# Patient Record
Sex: FEMALE | URBAN - METROPOLITAN AREA
[De-identification: names, ages, dates, MRNs, and addresses within clinical notes are randomized per-mention and may not be internally consistent; named-entity substitution may affect disease eponyms.]

---

## 2017-02-13 ENCOUNTER — IMPORTED ENCOUNTER (OUTPATIENT)
Dept: URBAN - METROPOLITAN AREA CLINIC 1 | Facility: CLINIC | Age: 37
End: 2017-02-13

## 2017-02-13 PROBLEM — H10.45: Noted: 2017-02-13

## 2017-02-13 PROBLEM — H00.11: Noted: 2017-02-13

## 2017-02-13 PROBLEM — H01.001: Noted: 2017-02-13

## 2017-02-13 PROBLEM — H00.12: Noted: 2017-02-13

## 2017-02-13 PROBLEM — H01.004: Noted: 2017-02-13

## 2017-02-13 PROCEDURE — 92012 INTRM OPH EXAM EST PATIENT: CPT

## 2017-02-13 NOTE — PATIENT DISCUSSION
1.  Chalazion right lower eyelid - Hot compresses TID x 5 minutes for 3 weeks. If without improvement discussed with patient possible Incision and Drainage procedure. Risks and benefits discussed with patient and patient states full understanding. Also REC fish oil vitamin that may help with prevention 2. Allergic Conjunctivitis OU :  Condition discussed with patient. Advised patient to use OTC Zaditor one drop OU BID prn.3. Blepharitis anterior type OU - Daily warm compresses and lid scrubs were recommended-BID OU. 4. Return as scheduled with Dr. Nirmala Magaña.

## 2017-06-09 ENCOUNTER — IMPORTED ENCOUNTER (OUTPATIENT)
Dept: URBAN - METROPOLITAN AREA CLINIC 1 | Facility: CLINIC | Age: 37
End: 2017-06-09

## 2017-06-09 PROBLEM — H52.223: Noted: 2017-06-09

## 2017-06-09 PROBLEM — H52.13: Noted: 2017-06-09

## 2017-06-09 PROCEDURE — S0621 ROUTINE OPHTHALMOLOGICAL EXA: HCPCS

## 2017-06-09 NOTE — PATIENT DISCUSSION
1. Myopia OU 2. Astigmatism OU- Rx was given for correction if indicated and requested. 3. Return for an appointment in 1 year for 40 with Dr. Cipriano Kirk.

## 2018-05-04 NOTE — PATIENT DISCUSSION
CORNEAL ABRASION: OD, POSSIBLY VIRAL (HX RECURRENT COLD SORES) PRESCRIBE: EMYCIN ALBA QHS X 3 DAYS, 1 GRAM VALTREX TID PO X 1 WEEK. RETURN FOR FOLLOW-UP AS SCHEDULED.

## 2018-05-11 NOTE — PATIENT DISCUSSION
EXPOSURE KERATOCONJUNCTIVITIS : PRESCRIBED PRESERVATIVE FREE ARTIFICIAL TEARS 4-6X A DAY, OU AND THE DAILY INTAKE OF OMEGA-3 DHA/EPA FATTY ACIDS TO HELP RELIEVE SYMPTOMS. ADD NIGHTLY LUBRICATING OINTMENT OR GEL.

## 2018-05-11 NOTE — PATIENT DISCUSSION
RECURRING CORNEAL EROSION - OD. PRESCRIBE LUBRICATING ARTIFICIAL TEARS BID - QID OU. ADD GEL DROP AT BEDTIME.

## 2018-08-13 NOTE — PATIENT DISCUSSION
EXPOSURE KERATOCONJUNCTIVITIS : CONTINUE PRESERVATIVE FREE ARTIFICIAL TEARS 4-6X A DAY, OU AND THE DAILY INTAKE OF OMEGA-3 DHA/EPA FATTY ACIDS TO HELP RELIEVE SYMPTOMS. ADD NIGHTLY LUBRICATING OINTMENT OR GEL.

## 2018-08-13 NOTE — PATIENT DISCUSSION
RECURRING CORNEAL EROSION - OD. CONTINUE LUBRICATING ARTIFICIAL TEARS BID - QID OU. ADD GEL DROP AT BEDTIME.

## 2018-10-12 ENCOUNTER — HOSPITAL ENCOUNTER (OUTPATIENT)
Dept: PHYSICAL THERAPY | Age: 38
Discharge: HOME | End: 2018-10-12
Payer: COMMERCIAL

## 2018-10-12 PROCEDURE — 97162 PT EVAL MOD COMPLEX 30 MIN: CPT

## 2018-10-12 PROCEDURE — 97016 VASOPNEUMATIC DEVICE THERAPY: CPT

## 2018-10-12 NOTE — PROGRESS NOTES
[address] PHYSICAL THERAPY AT [facility] [address]  Phone [phone]  Fax [phone] PLAN OF CARE / STATEMENT OF MEDICAL NECESSITY FOR PHYSICAL THERAPY SERVICES Patient Name: Danika Hernadez : 1980 Medical  
Diagnosis: Shoulder pain [M25.519] Treatment Diagnosis: Right Shoulder pain Onset Date: 3 weeks ago Referral Source: Tico Gerardo MD Start of Care [facility]): 10/12/2018 Prior Hospitalization: See medical history Provider #: [id number] Prior Level of Function: Functionally I Comorbidities: MS Relapsing remitting and thyroid disrer Medications: Verified on Patient Summary List  
The Plan of Care and following information is based on the information from the initial evaluation.  
================================================================================= Assessment / key information:   
34year old female presents for PT evaluation for Right Shoulder pain. Patient reports that the pain is to the lateral and anterior Right shoulder that began about 3 weeks ago. Patient reports pain ranging from a 0/10 at rest to a 8/10 with any overhead motion or sleeping on the right side. Patient does report another episode 3 years ago, that was treated with a cortisone injection and resolved the pain. Patient also complains of numbness to the right thumb. PMHX is significant for MS relapsing remitting with last relapse of MS 4 years ago. Patient does report heat sensitivity and fatigue as MD symptoms. Patient is employed full time and performs desk work. Functional limitations include inability to participate in recreational activities of swimming and lifting weights without an increase in pain. FOTO= 59/100 Clinical exam reveals full AROM of Right shoulder with (+) pain ful arc of motion starting at ~90* of shoulder elevation.  Patient is TTP along SS tendon. Significant trigger points present along Right Upper Traps, Rhomboids, Levator scapulae and middle traps. (+) Cain Odor sign for impingement. Patients sitting posture is remarkable for Right shoulder being anterior and superior compared to left side. Right shoulder Flexion 4/5, Scaption 4/5, ER 4/5- all positive for increased pain. Right Lower Trap 4/5. Left UE 5/5. Patient is Right handed. Patient will benefit from an episode of skilled PT to address above functional limitations and clinical deficits to allow for improved QOL and return to PLOF.  
================================================================================= Eval Complexity: History: MEDIUM  Complexity : 1-2 comorbidities / personal factors will impact the outcome/ POC Exam:HIGH Complexity : 4+ Standardized tests and measures addressing body structure, function, activity limitation and / or participation in recreation  Presentation: MEDIUM Complexity : Evolving with changing characteristics  Clinical Decision Making:MEDIUM Complexity : FOTO score of 26-74Overall Complexity:MEDIUM Problem List: pain affecting function, decrease strength, edema affecting function, impaired gait/ balance, decrease ADL/ functional abilitiies, decrease activity tolerance and decrease flexibility/ joint mobility Treatment Plan may include any combination of the following: Therapeutic exercise, Therapeutic activities, Physical agent/modality, Manual therapy, Patient education, Self Care training and Functional mobility training Patient / Family readiness to learn indicated by: asking questions Persons(s) to be included in education: patient (P) Barriers to Learning/Limitations: None Measures taken:   
Patient Goal (s): To be able to return to swimming and lifting weights Patient self reported health status: good Rehabilitation Potential: good ·                   Short Term Goals: To be accomplished in  3  treatments 1. Patient will demonstrate I and compliance with HEP to increase scapular strength and improve pain free AROM of Right UE. 2. Patient will report decreased pain no greater than 1/10 with activity to allow increased tolerance to activity. · Long Term Goals: To be accomplished in  8-12  treatments: 1. Patient will increase Right UE and Postural musculature strength to 5/5 without an increase in pain to allow for return to PLOF 2. Patient will increase FOTO scores from 59/100 to 72/100 to demonstrate increased functional independence. 3. Patient will test negative for impingement signs to allow for proper retraining of Right UE mm.  
4. 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 Frequency / Duration:   Patient to be seen  2-3  times per week for 8-12 treatments: 
Patient / Caregiver education and instruction: self care and exercises G-Codes (GP): JUMA Therapist Signature: Gigi Nunez PT Date: 10/12/2018 Certification Period: NA Time: 7:41 AM  
=========================================================================================== I certify that the above Physical Therapy Services are being furnished while the patient is under my care. I agree with the treatment plan and certify that this therapy is necessary. Physician Signature:       Date:      Time:  Please sign and return to [facility] or you may fax the signed copy to [phone]. Thank you.

## 2018-10-12 NOTE — PROGRESS NOTES
PT DAILY TREATMENT NOTE/SHOULDER EVAL 10-18 Patient Name: Katerin Morin Date:10/12/2018 : 1980 [x]  Patient  Verified Payor: BLUE CROSS / Plan: [facility] PPO / Product Type: PPO / In time:739  Out time:830 Total Treatment Time (min): 51 Visit #: 1 of  Medicare/BCBS Only Total Timed Codes (min):  0 1:1 Treatment Time:  36  
 
 
Treatment Area: Shoulder pain [M25.519] SUBJECTIVE Pain Level (0-10 scale): 10 []constant [x]intermittent [x]improving []worsening []no change since onset Any medication changes, allergies to medications, adverse drug reactions, diagnosis change, or new procedure performed?: [x] No    [] Yes (see summary sheet for update) Subjective functional status/changes: OBJECTIVE/EXAMINATION Modality rationale: decrease inflammation and decrease pain to improve the patients ability to perform overhead activities without pain Min Type Additional Details  
 [] Estim:  []Unatt       []IFC  []Premod []Other:  []w/ice   []w/heat Position: Location:  
 [] Estim: []Att    []TENS instruct  []NMES []Other:  []w/US   []w/ice   []w/heat Position: Location:  
 []  Traction: [] Cervical       []Lumbar 
                     [] Prone          []Supine []Intermittent   []Continuous Lbs: 
[] before manual 
[] after manual  
 []  Ultrasound: []Continuous   [] Pulsed []1MHz   []3MHz Location: 
W/cm2:  
 []  Iontophoresis with dexamethasone Location: [] Take home patch  
[] In clinic  
 []  Ice     []  heat 
[]  Ice massage 
[]  Laser  
[]  Anodyne Position: Location:  
 []  Laser with stim 
[]  Other: Position: Location:  
15 [x]  Vasopneumatic Device Pressure:       [] lo [x] med [] hi  
Temperature: [x] lo [] med [] hi  
[x] Skin assessment post-treatment:  [x]intact []redness- no adverse reaction 
  []redness  adverse reaction: 36 min [x]Eval                  []Re-Eval  
 
   
With 
 [] TE 
 [] TA 
 [] neuro 
 [] other: Patient Education: [x] Review HEP [] Progressed/Changed HEP based on:  
[] positioning   [] body mechanics   [] transfers   [] heat/ice application   
[] other:   
 
Physical Therapy Evaluation - Shoulder Posture: Right shoulder being anterior and superior compared to left side. Right shoulder ROM:  [] Unable to assess at this time 
full AROM of Right shoulder with (+) pain ful arc of motion starting at ~90* of shoulder elevation. Strength: Right UE:Flexion 4/5, Scaption 4/5, ER 4/5- all positive for increased pain. Right Lower Trap 4/5. Left UE 5/5. Accessory Motions: Increase use of Right Upper trap with overhead motion. Palpation: Significant trigger points present along Right Upper Traps, Rhomboids, Levator scapulae and middle traps. Special Tests:  
(+) Ardine Mellow sign for impingement. (-) Empty can Pain Level (0-10 scale) post treatment: 0/10 ASSESSMENT/Changes in Function:  
Patient will benefit from an episode of skilled PT to address above functional limitations and clinical deficits to allow for improved QOL and return to PLOF. [x]  See Plan of Care 
[]  See progress note/recertification 
[]  See Discharge Summary Progress towards goals / Updated goals: ·                   Short Term Goals: To be accomplished in  3  treatments 1. Patient will demonstrate I and compliance with HEP to increase scapular strength and improve pain free AROM of Right UE. 2. Patient will report decreased pain no greater than 1/10 with activity to allow increased tolerance to activity.  
  
·     Long Term Goals: To be accomplished in  8-12  treatments: 1. Patient will increase Right UE and Postural musculature strength to 5/5 without an increase in pain to allow for return to PLOF 2.  Patient will increase FOTO scores from 59/100 to 72/100 to demonstrate increased functional independence. 3. Patient will test negative for impingement signs to allow for proper retraining of Right UE mm.  
4. 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   
 
PLAN 
[]  Upgrade activities as tolerated     []  Continue plan of care 
[]  Update interventions per flow sheet      
[]  Discharge due to:_ 
[x]  Other:_   
Frequency / Duration: Patient to be seen  2-3  times per week for 8-12 treatments: 
 
 
 
Pauly Javier, PT 10/12/2018  7:40 AM

## 2018-10-17 ENCOUNTER — HOSPITAL ENCOUNTER (OUTPATIENT)
Dept: PHYSICAL THERAPY | Age: 38
Discharge: HOME | End: 2018-10-17
Payer: COMMERCIAL

## 2018-10-17 PROCEDURE — 97014 ELECTRIC STIMULATION THERAPY: CPT

## 2018-10-17 PROCEDURE — 97140 MANUAL THERAPY 1/> REGIONS: CPT

## 2018-10-17 PROCEDURE — 97110 THERAPEUTIC EXERCISES: CPT

## 2018-10-17 NOTE — PROGRESS NOTES
PT DAILY TREATMENT NOTE  Patient Name: Jose Eduardo Betts Date:10/17/2018 : 1980 [x]  Patient  Verified Payor: BLUE CROSS / Plan: [facility] PPO / Product Type: PPO / In time:710  Out time:815 Total Treatment Time (min): 65 Total Timed Codes (min): 50 
1:1 Treatment Time (min): 35 Visit #: 2 of  Treatment Area: Shoulder pain [M25.519] SUBJECTIVE Pain Level (0-10 scale): 0/10 at rest 
Any medication changes, allergies to medications, adverse drug reactions, diagnosis change, or new procedure performed?: [x] No    [] Yes (see summary sheet for update) Subjective functional status/changes:   [] No changes reported Pt retold subjective history OBJECTIVE Modality rationale: decrease pain and increase tissue extensibility to improve the patients ability to tolerate prolonged siting and typing at computer. Type Additional Details 15 [x] Estim: []Att   []Unatt        []TENS instruct []IFC  [x]Premod   []NMES []Other:  []w/US   [x]w/ice   []w/heat Position: Location:  
[]  Traction: [] Cervical       []Lumbar 
                     [] Prone          []Supine []Intermittent   []Continuous Lbs: 
[] before manual 
[] after manual  
[]  Ultrasound: []Continuous   [] Pulsed []1MHz   []3MHz Location: 
W/cm2:  
[]  Iontophoresis with dexamethasone Location: [] Take home patch  
[] In clinic  
[]  Ice     []  heat 
[]  Ice massage Position: Location:  
[]  Vasopneumatic Device Pressure:       [] lo [] med [] hi  
Temperature: [] lo [] med [] hi  
[x] Skin assessment post-treatment:  [x]intact []redness- no adverse reaction 
     []redness  adverse reaction:  
 
 
35/30 min Therapeutic Exercise:  [x] See flow sheet : 5 min warm up NC. 20 min 1:1 Rationale: increase strength to improve the patients ability to tolerate work outs 15 min Manual Therapy:  Rt shoulder in supine and s/l . DTM along Rt UT, SS, Rhomboids. Sub scap release. Grade II joint mobs posterior and inferior Rationale: decrease pain, increase ROM, increase tissue extensibility and decrease trigger points to improve I with daily functional tasks 
       
 min Patient Education: [x] Review HEP    [] Progressed/Changed HEP based on:  
[] positioning   [] body mechanics   [] transfers   [] heat/ice application Other Objective/Functional Measures:  
First visit from [facility] Increased tension along subscapularis Increased anterior shoulder position with scapular strengthening therex. Patient able to correct w VCs. Pain Level (0-10 scale) post treatment: 0/10 ASSESSMENT/Changes in Function: Patient responded to first treatment session well with good ability to perform therex with proper form with demonstration and verbal cues. Patient continues to present with decreased scapular and postural strength as demonstrated by fatigue with strengthening therex. Patient will continue to benefit from skilled PT services to modify and progress therapeutic interventions, address functional mobility deficits, address strength deficits, analyze and address soft tissue restrictions and assess and modify postural abnormalities to attain remaining goals. []  See Plan of Care 
[]  See progress note/recertification 
[]  See Discharge Summary Progress towards goals / Updated goals: 
First session from Eval, reassess NV 
 
PLAN [x]  Upgrade activities as tolerated     []  Continue plan of care 
[]  Update interventions per flow sheet      
[]  Discharge due to:_ 
[]  Other:_ Juan M Hopkins PT 10/17/2018  7:33 AM

## 2018-10-19 ENCOUNTER — HOSPITAL ENCOUNTER (OUTPATIENT)
Dept: PHYSICAL THERAPY | Age: 38
Discharge: HOME | End: 2018-10-19
Payer: COMMERCIAL

## 2018-10-19 PROCEDURE — 97140 MANUAL THERAPY 1/> REGIONS: CPT

## 2018-10-22 ENCOUNTER — HOSPITAL ENCOUNTER (OUTPATIENT)
Dept: PHYSICAL THERAPY | Age: 38
Discharge: HOME | End: 2018-10-22
Payer: COMMERCIAL

## 2018-10-22 PROCEDURE — 97016 VASOPNEUMATIC DEVICE THERAPY: CPT | Performed by: PHYSICAL THERAPIST

## 2018-10-22 PROCEDURE — 97140 MANUAL THERAPY 1/> REGIONS: CPT | Performed by: PHYSICAL THERAPIST

## 2018-10-22 PROCEDURE — 97110 THERAPEUTIC EXERCISES: CPT | Performed by: PHYSICAL THERAPIST

## 2018-10-22 NOTE — PROGRESS NOTES
Request for use of Dry Needling/Intramuscular Manual Therapy Patient: Nicholas Mahan     Referral Source: Monika Viera MD 
Diagnosis: Shoulder pain [M25.519]      : 1980 Date of initial visit: 10/12/18   Attended visits: 3  Missed Visits: 0 Based on findings from the physical therapy examination and evaluation, the evaluating therapist believes the patient, Alessandra Gonzalez  would benefit from including Dry Needling as part of the plan of care. Dry needling is a treatment technique utilized in conjunction with other PT interventions to inactivate myofascial trigger points and the pain and dysfunction they cause. Dry Needling is an advanced procedure that requires additional training including greater than 54 hours of intensive course work. Physical Therapists at [address] [place] [address] are certified through [address] [place] [address] courses for their education and are certified to perform treatments. PROCEDURE: 
? Solid filament sterile needle (typically 0.3mm/30 gauge) inserted into a trigger point ? Repeated movements inactivate the trigger points, taking 30-60 seconds per site ? Typically consists of 1 dry needling session per week and a possible second treatment including muscle re-education, flexibility, strengthening and other manual techniques to facilitate the benefits of dry needling BENEFITS: 
? Inactivation of trigger points ? Decreased pain ? Increased muscle length ? Improved movement patterns ? Restoration of function POTENTIAL RISKS: 
? Post-needling soreness ? Infection ? Bruising/bleeding ? Penetration of a nerve ? Pneumothorax All treating PTs have been thoroughly educated in avoiding adverse reactions If you agree with this recommendation, please sign this form and fax it to us at [phone]. If you have questions or concerns regarding dry needling or any other treatment we may be providing, please contact us at [phone]. Thank you for allowing us to assist in the care of your patient. Haily Delcid, PT    10/21/2018 8:51 PM  
NOTE TO PHYSICIAN:  PLEASE COMPLETE THE ORDERS BELOW AND  
FAX TO [facility]: [phone] If you are unable to process this request in 24 hours please contact our office:  
[phone] ? I have read the above request and AGREE to the recommendation of including dry needling as part of the plan of care. ? I have read the above request and DO NOT AGREE to including dry needling as part of the plan of care. ? I have read the above report and request that my patient continue therapy with the following changes/special instructions: 
________________________________________________________________________ Physicians signature: _______________________________________________ Date: ______________Time:_______________

## 2018-10-22 NOTE — PROGRESS NOTES
PT DAILY TREATMENT NOTE  Patient Name: Donte Sal Date:10/21/2018 : 1980 [x]  Patient  Verified Payor: BLUE CROSS / Plan: [facility] PPO / Product Type: PPO / In time:245  Out time:351 Total Treatment Time (min): 66 Total Timed Codes (min): 61 
1:1 Treatment Time (min): 30 Visit #: 3 of  Treatment Area: Shoulder pain [M25.519] SUBJECTIVE Pain Level (0-10 scale): 0/10 Any medication changes, allergies to medications, adverse drug reactions, diagnosis change, or new procedure performed?: [x] No    [] Yes (see summary sheet for update) Subjective functional status/changes:   [] No changes reported I noticed with the exercises my Left arm stays nice and in place but my Right arm hikes up an forward without me knowing it. OBJECTIVE 
 
36/31 min Therapeutic Exercise:  [x] See flow sheet : 5 min NC for warm up Rationale: increase strength to improve the patients ability to tolerate overhead motion 30 min Manual Therapy:  Rt shoulder in supine and s/l . DTM along Rt UT, SS, Rhomboids. Sub scap release. Grade II joint mobs posterior and inferior. KTape to assist Right shoulder posteriorly and inferiorly. 2 \"I\" and 2\"Y\" Rationale: decrease pain, increase tissue extensibility and decrease trigger points to improve tolerance to activity with Right UE 
     
 min Patient Education: [x] Review HEP    [] Progressed/Changed HEP based on:  
[] positioning   [] body mechanics   [] transfers   [] heat/ice application Other Objective/Functional Measures:  
Continued significant TrP along Right UT, LS and Rhomboids. Rounded / Forward Shoulder Right Pain Level (0-10 scale) post treatment: 0/10 ASSESSMENT/Changes in Function: Patient tolerated all therex well with no reports of increased pain. Noted weakness along postural mm on right.   
Patient will continue to benefit from skilled PT services to modify and progress therapeutic interventions, address strength deficits, analyze and address soft tissue restrictions and assess and modify postural abnormalities to attain remaining goals. []  See Plan of Care 
[]  See progress note/recertification 
[]  See Discharge Summary Progress towards goals / Updated goals: 
Reassess goals NV 
 
PLAN [x]  Upgrade activities as tolerated     []  Continue plan of care 
[]  Update interventions per flow sheet      
[]  Discharge due to:_ 
[x]  Other:_   Assess effectiveness of KTape NV for Right shoulder Jeanette Market, PT 10/21/2018  8:45 PM

## 2018-10-22 NOTE — PROGRESS NOTES
PT DAILY TREATMENT NOTE 8 Patient Name: Pelon Lang Date:10/22/2018 : 1980 [x]  Patient  Verified Payor: BLUE CROSS / Plan: [facility] PPO / Product Type: PPO / In time: 710am  Out time: 816am 
Total Treatment Time (min): 66min Total Timed Codes (min): 62 
1:1 Treatment Time (min): 52 Visit #: 4 of  Treatment Area: Shoulder pain [M25.519] SUBJECTIVE Pain Level (0-10 scale): 1/10 Any medication changes, allergies to medications, adverse drug reactions, diagnosis change, or new procedure performed?: [x] No    [] Yes (see summary sheet for update) Subjective functional status/changes:   [] No changes reported I noticed with the exercises my Left arm stays nice and in place but my Right arm hikes up an forward without me knowing it. I feel Michaelyn Habermann, only bothers me when I move it. OBJECTIVE 
10 []  Vasopneumatic Device Pressure:       [] lo [x] med [] hi  
Temperature: [x] lo [] med [] hi  
[] Skin assessment post-treatment:  []intact []redness- no adverse reaction 
     []redness  adverse reaction:  
 
33/29 min Therapeutic Exercise:  [x] See flow sheet : 5 min NC for warm up Rationale: increase strength to improve the patients ability to tolerate overhead motion 23 min Manual Therapy: Grade II joint mobs posterior and inferior, SL scapular mobes with depression isometric , XFM to biceps tendon Rationale: decrease pain, increase tissue extensibility and decrease trigger points to improve tolerance to activity with Right UE 
     
 min Patient Education: [x] Review HEP    [] Progressed/Changed HEP based on:  
[] positioning   [] body mechanics   [] transfers   [] heat/ice application Other Objective/Functional Measures:  
 
Progressed to modified post capsule stretch (heel of hand on forehead), increased reps and # to 2lb for SL ER/ABD, progressed to II bars for push ups Pain Level (0-10 scale) post treatment: 0/10 ASSESSMENT/Changes in Function: Patient tolerated all therex well with no reports of increased pain. Noted fwd shoulder with post capsule tightness with tenderness along biceps tendon. End range shoulder pinching in full flexion Continue to work on stability of shoulder. Patient will continue to benefit from skilled PT services to modify and progress therapeutic interventions, address strength deficits, analyze and address soft tissue restrictions and assess and modify postural abnormalities to attain remaining goals. []  See Plan of Care 
[]  See progress note/recertification 
[]  See Discharge Summary Progress towards goals / Updated goals: 
Short Term Goals: To be accomplished in  3  treatments 1. Patient will demonstrate I and compliance with HEP to increase scapular strength and improve pain free AROM of Right UE. 2. Patient will report decreased pain no greater than 1/10 with activity to allow increased tolerance to activity. MET 10/22/18 reports 1 pain up entering today 10/22/18 
  
·         Long Term Goals: To be accomplished in  8-12  treatments: 1. Patient will increase Right UE and Postural musculature strength to 5/5 without an increase in pain to allow for return to PLOF 2. Patient will increase FOTO scores from 59/100 to 72/100 to demonstrate increased functional independence. 3. Patient will test negative for impingement signs to allow for proper retraining of Right UE mm.  
4. 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 PLAN [x]  Upgrade activities as tolerated     []  Continue plan of care 
[]  Update interventions per flow sheet      
[]  Discharge due to:_ 
[x]  Other:_   
 
Marcelina Epstein, PT 10/22/2018  8:45 PM

## 2018-10-26 ENCOUNTER — APPOINTMENT (OUTPATIENT)
Dept: PHYSICAL THERAPY | Age: 38
End: 2018-10-26
Payer: COMMERCIAL

## 2018-10-29 ENCOUNTER — APPOINTMENT (OUTPATIENT)
Dept: PHYSICAL THERAPY | Age: 38
End: 2018-10-29
Payer: COMMERCIAL

## 2018-11-01 ENCOUNTER — HOSPITAL ENCOUNTER (OUTPATIENT)
Dept: PHYSICAL THERAPY | Age: 38
Discharge: HOME | End: 2018-11-01
Payer: COMMERCIAL

## 2018-11-01 ENCOUNTER — APPOINTMENT (OUTPATIENT)
Dept: PHYSICAL THERAPY | Age: 38
End: 2018-11-01
Payer: COMMERCIAL

## 2018-11-01 PROCEDURE — 97016 VASOPNEUMATIC DEVICE THERAPY: CPT | Performed by: PHYSICAL THERAPIST

## 2018-11-01 PROCEDURE — 97140 MANUAL THERAPY 1/> REGIONS: CPT | Performed by: PHYSICAL THERAPIST

## 2018-11-02 NOTE — PROGRESS NOTES
PT DAILY TREATMENT NOTE - [facility]  Patient Name: Olaf Louise Date:2018 : 1980 [x]  Patient  Verified Payor: BLUE CROSS / Plan: [place] / Product Type: PPO / In time:605  Out time:700 Total Treatment Time (min): 55 
1:1 Treatment Time ( only): 25 Visit #: 5 of 8-12 Treatment Area: Pain in unspecified shoulder [M25.519] SUBJECTIVE Pain Level (0-10 scale): 0 Any medication changes, allergies to medications, adverse drug reactions, diagnosis change, or new procedure performed?: [x] No    [] Yes (see summary sheet for update) Subjective functional status/changes:   [] No changes reported Pt repeated subjective history and what increases her pain. OBJECTIVE Modality rationale: decrease edema, decrease inflammation and decrease pain to improve the patients ability to improve soreness post IMT Min Type Additional Details  
 [] Estim: []Att   []Unatt        []TENS instruct []IFC  []Premod   []NMES []Other:  []w/US   []w/ice   []w/heat Position: Location:  
 []  Traction: [] Cervical       []Lumbar 
                     [] Prone          []Supine []Intermittent   []Continuous Lbs: 
[] before manual 
[] after manual  
 []  Ultrasound: []Continuous   [] Pulsed []1MHz   []3MHz Location: 
W/cm2:  
 []  Iontophoresis with dexamethasone Location: [] Take home patch  
[] In clinic  
 []  Ice     []  heat 
[]  Ice massage Position: Location:  
 []  Laser 
[]  Other: Position: Location:  
15 [x]  Vasopneumatic Device Pressure:       [] lo [x] med [] hi  
Temperature: [x] lo [] med [] hi  
[] Skin assessment post-treatment:  []intact []redness- no adverse reaction 
  []redness  adverse reaction:  
 
10 min Therapeutic Exercise:  [] See flow sheet :  
Rationale: increase ROM and increase strength to improve the patients ability to improve ADL tolerance 30 min Manual Therapy: Technique:     
[x] S/DTM []IASTM [x]PROM [x] Passive Stretching  
[] Graston: 
[] GT 1  [] GT 2 [] GT 3 [] GT 4 [] GT 4 [] GT 5 
[] GT 6  [] Sweep [] Fan [] [place]  [] Brush  
[]  Swivel []J- Stroke [] Scoop []IFraming    
[x]Manual TPR  [x] TDN (see objective; actual needle insertion time not billed) []Jt manipulation:Gr I [] II []  III [] IV[] V[] Treatment/Area: R shoulder Rationale:      decrease pain, increase ROM, increase tissue extensibility and decrease trigger points to improve patient's ability to return to working out without restrictions With 
 [] TE 
 [] TA 
 [] neuro 
 [] other: Patient Education: [x] Review HEP [] Progressed/Changed HEP based on:  
[] positioning   [] body mechanics   [] transfers   [] heat/ice application [] Graston Education: Explained the effects and benefits of Graston Technique therapy including potential for post treatment soreness and bruising. [] Other:   
 
Dry Needling Procedure Note Dry Needle Session Number:  1 Procedure: An intramuscular manual therapy (dry needling) and a neuro-muscular re-education treatment was done to deactivate myofascial trigger points, with a 15/30 gauge solid filament needle, under aseptic technique. Indication(s): [x] Muscle spasms [x] Myalgia/Myositis  [] Muscle cramps  
   [] Muscle imbalances [] TMD (TMJ) [x] Myofascial pain & dysfunction 
   [] Other: __ Chart reviewed for the following: 
Satish Shaw DPT, have reviewed the medical history, summary list and precautions/contraindications for "Mobile Location, IP" UtilAFTER-MOUSE. TIME OUT performed immediately prior to start of procedure: 
630 PM (enter time the timeout was completed) Satish Shaw DPT, have performed the following reviews on [employer] prior to the start of the session:     
[x] Patient was identified by name and date of birth   
[x] Agreement on all muscles being treated was verified [x] Purpose of dry needling, side effects, possible complications, risks and benefits were explained to the patient [x] Procedure site(s) verified 
[x] Patient was positioned for comfort and draped for privacy [x] Informed Consent was signed (initial visit) and verified verbally (subsequent visits) [x] Patient was instructed on the need to report the use of blood thinners and/or immunosuppressant medications [x] How to respond to possible adverse effects of treatment 
[x] Self treatment of post needling soreness: ice, heat (moist heat, heat wraps) and stretching 
[x] Opportunity was given to ask any questions, all questions were answered Treatment: The following muscles were treated today: 
 
Right: Infraspinatus Left:   
 
Patients response to todays treatment:  
[x]  LTRs  [x]  Muscle Relaxation  []  Pain Relief  []  Decreased Tinnitus 
[]  Decreased HAs [x]  Post needling soreness []  Increased ROM []  Other:   
 
Other Objective/Functional Measures:  
Significant trigger point in the R Infraspinatus Pain Level (0-10 scale) post treatment: 6/10 ASSESSMENT/Changes in Function:  
+ response in the R infranspinatus leading to R anterior shoulder pain. Assess effects of IMT next session. Patient will continue to benefit from skilled PT services to modify and progress therapeutic interventions, address functional mobility deficits, address ROM deficits, address strength deficits, analyze and address soft tissue restrictions, analyze and modify body mechanics/ergonomics and assess and modify postural abnormalities to attain remaining goals. Progress towards goals / Updated goals: 
Goals not assessed secondary to TC due to addition of IMT this session. Will assess next visit. PLAN 
[]  Upgrade activities as tolerated     [x]  Continue plan of care 
[]  Update interventions per flow sheet      
[]  Discharge due to:_ 
[x]  Other: Cjeck goals next visit Virginia Lei, LUIS 11/1/2018  8:13 PM

## 2018-11-05 ENCOUNTER — HOSPITAL ENCOUNTER (OUTPATIENT)
Dept: PHYSICAL THERAPY | Age: 38
Discharge: HOME | End: 2018-11-05
Payer: COMMERCIAL

## 2018-11-05 PROCEDURE — 97110 THERAPEUTIC EXERCISES: CPT | Performed by: PHYSICAL THERAPIST

## 2018-11-05 PROCEDURE — 97140 MANUAL THERAPY 1/> REGIONS: CPT | Performed by: PHYSICAL THERAPIST

## 2018-11-05 PROCEDURE — 97016 VASOPNEUMATIC DEVICE THERAPY: CPT | Performed by: PHYSICAL THERAPIST
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
## 2018-11-16 ENCOUNTER — APPOINTMENT (OUTPATIENT)
Dept: PHYSICAL THERAPY | Age: 38
End: 2018-11-16
Payer: COMMERCIAL

## 2018-11-20 ENCOUNTER — APPOINTMENT (OUTPATIENT)
Dept: PHYSICAL THERAPY | Age: 38
End: 2018-11-20
Payer: COMMERCIAL

## 2018-11-27 ENCOUNTER — APPOINTMENT (OUTPATIENT)
Dept: PHYSICAL THERAPY | Age: 38
End: 2018-11-27
Payer: COMMERCIAL

## 2018-11-30 ENCOUNTER — APPOINTMENT (OUTPATIENT)
Dept: PHYSICAL THERAPY | Age: 38
End: 2018-11-30
Payer: COMMERCIAL

## 2019-05-14 NOTE — PATIENT DISCUSSION
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 CONTINUE PRESERVATIVE FREE ARTIFICIAL TEARS 4-6X A DAY, OU AND THE DAILY INTAKE OF OMEGA-3 DHA/EPA FATTY ACIDS TO HELP RELIEVE SYMPTOMS. ADD NIGHTLY LUBRICATING OINTMENT OR GEL.

## 2021-07-09 ENCOUNTER — HOSPITAL ENCOUNTER (OUTPATIENT)
Dept: PHYSICAL THERAPY | Age: 41
Discharge: HOME | End: 2021-07-09
Payer: COMMERCIAL

## 2021-07-09 PROCEDURE — 97162 PT EVAL MOD COMPLEX 30 MIN: CPT | Performed by: PHYSICAL THERAPIST

## 2021-07-09 PROCEDURE — 97140 MANUAL THERAPY 1/> REGIONS: CPT | Performed by: PHYSICAL THERAPIST

## 2021-07-09 PROCEDURE — 97035 APP MDLTY 1+ULTRASOUND EA 15: CPT | Performed by: PHYSICAL THERAPIST

## 2021-07-09 PROCEDURE — 97110 THERAPEUTIC EXERCISES: CPT | Performed by: PHYSICAL THERAPIST

## 2021-07-09 NOTE — PROGRESS NOTES
[id number] [facility] AT [facility]  [address] [address], [place], [place]  Phone [phone]  Fax [phone] / [address] [address] PHYSICAL THERAPY SERVICES  Patient Name: Marlo Cote : 1980   Medical   Diagnosis: Left forearm pain [M79.632] Treatment Diagnosis: Left forearm pain [M79.632]   Onset Date: 21     Referral Source: Referred, Self, MD Start of Care [facility]): 2021   Prior Hospitalization: See medical history Provider #: [id number]   Prior Level of Function: Indep   Comorbidities: Multiple Sclerosis, Thyroid Problems   Medications: Verified on Patient Summary List   The Plan of Care and following information is based on the information from the initial evaluation.   =================================================================================  Assessment / key information:  Patient is a pleasant 42yo female who comes to PT for L elbow/forearm pain following injury during olympic style lifting on 21. She states there is numbness/tingling in LUE fingertips and hands that is worse with desk work. Instructed patient to avoid compression of L wrist while typing at work. Patient reports current limitations with typing at work and participating in regular gym activity. Upon evaluation, pt presents with decreased Left Wrist AROM compared to right as follows: L Wrist Flex - 84deg, Ext - 61deg, Sup - 71deg, Pron - 79deg ; R Wrist Flex - 81deg, Ext - 60deg, Sup - 82deg, Pro - 89deg. Noted pain of proximal anterior left forearm with left wrist extension. Moderate tenderness noted upon palpation to left medial epicondyle and proximal muscle belly of left wrist flexors. Bilateral Elbow and Shoulder ROM is WNL. Left elbow strength is grossly 4+/5. Subjective and objective measures consistent with medial epicondylitis with strain of L wrist flexors.  Patient would benefit from course of skilled PT intervention to address PT impairments above and facilitate return to normal desk work as well as regular participation in gym exercise.  =================================================================================  Eval Complexity: History: MEDIUM  Complexity : 1-2 comorbidities / personal factors will impact the outcome/ POC Exam:HIGH Complexity : 4+ Standardized tests and measures addressing body structure, function, activity limitation and / or participation in recreation  Presentation: MEDIUM Complexity : Evolving with changing characteristics  Clinical Decision Making:MEDIUM Complexity : FOTO score of 26-74Overall Complexity:MEDIUM  Problem List: pain affecting function, decrease ROM, decrease strength, edema affecting function, decrease ADL/ functional abilitiies, decrease activity tolerance and decrease flexibility/ joint mobility   Treatment Plan may include any combination of the following: Therapeutic exercise, Therapeutic activities, Manual therapy, Patient education, Self Care training and Functional mobility training  Patient / Family readiness to learn indicated by: asking questions, trying to perform skills and interest  Persons(s) to be included in education: patient (P)  Barriers to Learning/Limitations: None  Measures taken: N/A   Patient Goal (s): \"Decreased tingling in hand and pain in forearm\"   Patient self reported health status: fair  Rehabilitation Potential: good   Short Term Goals: To be accomplished in  2  weeks:  1. Pt will be IND and compliant with HEP for self-management of symptoms.  Long Term Goals: To be accomplished in  4  weeks:  1. Patient will report pain at worst of 3/10 or less in L forearm to allow greater tolerance to full time duties at work. 2. Patient will report decreased frequency of numbness/tingling in L hand/fingers of 75% or more to facilitate greater ease with typing at work.   3. Patient will demonstrate symmetrical and painless wrist AROM to facilitate return to regular gym exercise. 4. Pt will improve FOTO score to at least 74/100 as a functional indicator of improved mobility. Frequency / Duration:   Patient to be seen  2-3  times per week for 4  weeks:  Patient / Caregiver education and instruction: self care, activity modification, brace/ splint application and exercises  Therapist Signature: RENE Ross/Lara Canela PT ,DPT Date: 0/5/0705   Certification Period: N/A Time: 7:05 AM   ===========================================================================================  I certify that the above Physical Therapy Services are being furnished while the patient is under my care. I agree with the treatment plan and certify that this therapy is necessary. Physician Signature:        Date:       Time:     Please sign and return to [facility] or you may fax the signed copy to [phone]. Thank you.   Obed Ramírez MD

## 2021-07-09 NOTE — PROGRESS NOTES
PT DAILY TREATMENT NOTE/ELBOW EVAL     Patient Name: Vinny Giles  Date:2021  : 1980  [x]  Patient  Verified  Payor: BLUE CROSS / Plan: [facility][1] [place] / Product Type: PPO /    In time:836  Out time:931  Total Treatment Time (min): 45  Visit #: 1 of     Medicare/BCBS Only   Total Timed Codes (min):  25 1:1 Treatment Time:  45     Treatment Area: Left forearm pain [M79.632]    SUBJECTIVE  Pain Level (0-10 scale): 6/10  [x]constant []intermittent []improving []worsening []no change since onset    Any medication changes, allergies to medications, adverse drug reactions, diagnosis change, or new procedure performed?: [x] No    [] Yes (see summary sheet for update)  Subjective functional status/changes:     CC: Left forearm pain  HPI  ·       Onset: Initially injured her left forearm 21 with barbell (deadlifting) and olympic style lifting (cleans) during [employer]. She states she felt soreness and aching on same day following exercise. She began to notice tingling in the lateral forearms along wrist extensors and into hands. She has continued with attending [employer] gym to exercise at decreased frequency. Currently she has soreness in L forearm, numbness/tingling in fingers, and L wrist clicking in L with movement. ·       Pain Level (0-10) scale: Best - 3/10         Worst - 8/10  Current - 6/10  o   Location: L forearm/wrist  o   Type: Aching  ·       Aggravating: Desk work, barbell exercises at [employer], movement  ·       Alleviating: Nothing  ·       PLOF: Indep  ·       Current Limitations: Regular [employer] gym exercise (decreased frequency)  ·       Treatment to date: Wearing wrist brace at night  PMHx/Prior Surgeries: Multiple Sclerosis (regular check-ups with MD)  Meds: See list  Work Hx: Analyst/Desk work  Patient Goals: Return to gym.  \"Decrease tingling in hand and pain in forearm\"  Cognition: A/Ox4    OBJECTIVE/EXAMINATION    Modality rationale: decrease inflammation and decrease pain to improve the patients ability to perform regular desk work/typing. Min Type Additional Details    [] Estim:  []Unatt       []IFC  []Premod                        []Other:  []w/ice   []w/heat  Position:  Location:    [] Estim: []Att    []TENS instruct  []NMES                    []Other:  []w/US   []w/ice   []w/heat  Position:  Location:    []  Traction: [] Cervical       []Lumbar                       [] Prone          []Supine                       []Intermittent   []Continuous Lbs:  [] before manual  [] after manual   8 [x]  Ultrasound: [x]Continuous   [] Pulsed                           [x]1MHz   []3MHz Location: Common flexor tendon of L elbow; medial epicodyle of elbow W/cm2: 1.4    []  Iontophoresis with dexamethasone         Location: [] Take home patch   [] In clinic    []  Ice     []  heat  []  Ice massage  []  Laser   []  Anodyne Position:  Location:    []  Laser with stim  []  Other: Position:  Location:    []  Vasopneumatic Device Pressure:       [] lo [] med [] hi   Temperature: [] lo [] med [] hi   [] Skin assessment post-treatment:  []intact []redness- no adverse reaction    []redness - adverse reaction:     20 min [x]Eval                  []Re-Eval       8 min Therapeutic Exercise:  [] See flow sheet : HEP exercises included: Seated eccentric wrist flexion/extension, Seated wrist radial/ulnar devation   Rationale: increase ROM and increase strength to improve the patients ability to perform regular exercise    9 min Manual Therapy:  DTM to L common flexor tendon and proximal wrist flexors. The manual therapy interventions were performed at a separate and distinct time from the therapeutic activities interventions. Rationale: decrease pain and increase tissue extensibility to facilitate return to gym activity.         With   [x] TE   [] TA   [] neuro   [] other: Patient Education: [x] Review HEP    [x] Progressed/Changed HEP based on:   [x] positioning - instructed patient to avoid L wrist compression while typing at work   [] body mechanics   [] transfers   [] heat/ice application    [] other:      Other Objective/Functional Measures:     Physical Therapy Evaluation- Elbow    Upper Extremity ROM                        [] Unable to assess at this time   WNL N/A   Left Shoulder [x] []    Right Shoulder [x] []      WFL N/A Flex Ext Sup Pro RD UD Pain   Left Elbow -  - WNL WNL N/A N/A N/A N/A - Yes   - No   Right Elbow - - WNL WNL N/A N/A N/A N/A - Yes   - No   Left Wrist [] [] 84deg 61deg p! 71deg 79deg 29deg 32deg [x] Yes   [] No   Right Wrist [x] [] 81deg 60deg 82deg 89deg 30deg 28deg [] Yes   [x] No        WNL N/A Flex Ext Pain   Left Elbow [] [] 4+/5 p! 4+/5 [x] Yes   [] No   Right Elbow [x] [] 5/5 5/5 [] Yes   [] No     Palpation  [] Min  [x] Mod  [] Severe    Location: anterior forearm over proximal wrist flexors (just distal to medial epicondyle); patient notes tingling and \"zinging\" down L forearm/wrist  [x] Min  [] Mod  [] Severe    Location: tenderness noted over wrist extensors on posterior L forearm. [] Min  [] Mod  [] Severe    Location:         Strength:  Dynamometer position 2   Right (lbs) Left (lbs) Pain/location   Elbow Flexed: (90 deg) Trial 1  76 74  Pain/L anterior forearm   Trial 2 78 73 Pain/L anterior forearm     Optional Tests:  Resisted Finger Test:   2nd & 3rd finger extension (ECRB or ECRL):[x] Neg   [] Pos    Pain Level (0-10 scale) post treatment: 6/10    ASSESSMENT/Changes in Function: Patient is a pleasant 44yo female who comes to PT for L elbow/forearm pain following injury during olympic style lifting on 6/8/21. She states there is numbness/tingling in LUE fingertips and hands that is worse with desk work. Instructed patient to avoid compression of L wrist while typing at work. Patient reports current limitations with typing at work and participating in regular gym activity.  Upon evaluation, pt presents with decreased Left Wrist AROM compared to right as follows: L Wrist Flex - 84deg, Ext - 61deg, Sup - 71deg, Pron - 79deg ; R Wrist Flex - 81deg, Ext - 60deg, Sup - 82deg, Pro - 89deg. Noted pain of proximal anterior left forearm with left wrist extension. Moderate tenderness noted upon palpation to left medial epicondyle and proximal muscle belly of left wrist flexors. Bilateral Elbow and Shoulder ROM is WNL. Left elbow strength is grossly 4+/5. Subjective and objective measures consistent with medial epicondylitis with strain of L wrist flexors. Patient would benefit from course of skilled PT intervention to address PT impairments above and facilitate return to normal desk work as well as regular participation in gym exercise. Patient will benefit from skilled PT services to modify and progress therapeutic interventions, address functional mobility deficits, address ROM deficits, address strength deficits, analyze and address soft tissue restrictions, analyze and cue movement patterns, analyze and modify body mechanics/ergonomics and instruct in home and community integration to attain PT goals.      [x]  See Plan of Care  []  See progress note/recertification  []  See Discharge Summary         Progress towards goals / Updated goals:  See POC    PLAN  [x]  Upgrade activities as tolerated     []  Continue plan of care  []  Update interventions per flow sheet       []  Discharge due to:_  [x]  Other: 2-3 times per week for 4 weeks      Justification for Eval Code Complexity:  Patient History : Multiple Sclerosis, Thyroid problems  Examination see exam  Clinical Presentation: Evolving with changing characteristics  Clinical Decision Making : FOTO : 61 /100      Velia Alicea, RENE 7/9/2021  6:58 AM

## 2021-07-12 ENCOUNTER — HOSPITAL ENCOUNTER (OUTPATIENT)
Dept: PHYSICAL THERAPY | Age: 41
Discharge: HOME | End: 2021-07-12
Payer: COMMERCIAL

## 2021-07-12 PROCEDURE — 97035 APP MDLTY 1+ULTRASOUND EA 15: CPT

## 2021-07-12 PROCEDURE — 97140 MANUAL THERAPY 1/> REGIONS: CPT

## 2021-07-12 PROCEDURE — 97110 THERAPEUTIC EXERCISES: CPT

## 2021-07-12 PROCEDURE — 97014 ELECTRIC STIMULATION THERAPY: CPT

## 2021-07-12 NOTE — PROGRESS NOTES
PT DAILY TREATMENT NOTE     Patient Name: Lupe Pérez  Date:2021  : 1980  [x]  Patient  Verified  Payor: BLUE CROSS / Plan: [employer] [facility] / Product Type: PPO /    In time:7:50  Out time:9:00  Total Treatment Time (min): 70  BC/BS 1:1 Time (min) 50  Visit #: 2 of     Treatment Area: Left forearm pain [M79.852]    SUBJECTIVE  Pain Level IN: (0-10 scale): 3-4/10   Pain Level OUT: (0-10 scale) post treatment: 2/10    Any medication changes, allergies to medications, adverse drug reactions, diagnosis change, or new procedure performed?: [x] No    [] Yes (see summary sheet for update)  Subjective functional status/changes:   [] No changes reported  I first thought it was my wrist that was the problem cause I was having tingling in my fingers and hand but after I was here the other day I realized it is coming from the inside of elbow       OBJECTIVE    Modalities Rationale:     decrease edema, decrease inflammation, decrease pain and increase tissue extensibility to improve patient's ability to achieve all goals stated below  15 min [x] Estim, type/location:  IFC to the medial aspect of elbow with MH                                     []  att     [x]  unatt     []  w/US     []  w/ice    [x]  w/heat    min []  Mechanical Traction: type/lbs                   []  pro   []  sup   []  int   []  cont    []  before manual    []  after manual   8 min [x]  Ultrasound, settings/location:  Medial aspect of the elbow 1.5 w/cm2 50% pulsed     min []  Iontophoresis w/ dexamethasone, location:                                               []  take home patch       []  in clinic    min []  Ice     []  Heat    location/position:     min []  Vasopneumatic Device, press/temp:     min []  Other:    [] Skin assessment post-treatment (if applicable):    []  intact    []  redness- no adverse reaction     []redness  adverse reaction:             32/27 min Therapeutic Exercise:  [x] See flow sheet : first follow up visit since initial evaluation - initiated POC per flow sheet   5 min NC    Rationale: increase ROM and increase strength to improve the patients ability to use of forearm and hand with normal ADL without increase pain     15 min Manual Therapy: Technique:      [] S/DTM []IASTM []PROM [] Passive Stretching   [] Graston:  [] GT 1  [] GT 2 [] GT 3 [] GT 4 [] GT 4 [] GT 5  [] GT 6  [] Sweep [] Fan [] [place]  [] Brush   []  Swivel []J- Stroke [] Scoop []IFraming     [x]Manual TPR  And DTM  []Jt manipulation:Gr I [] II []  III [] IV[] V[]  Treatment/Area: flexor carpi radialis, pronator teres and palmaris longus    Rationale:      decrease pain, increase ROM, increase tissue extensibility and decrease trigger points to improve patient's ability to use the (L) UE and hand for normal ADL        With   [] TE   [] TA   [] neuro   [] other: Patient Education: [x] Review HEP    [] Progressed/Changed HEP based on:   [] positioning   [] body mechanics   [] transfers   [] heat/ice application    [] Graston Education: Explained the effects and benefits of Graston Technique therapy including potential for post treatment soreness and bruising.   [] Other:           Objective/Functional Measures with Therapist Assessment Noted with Response to Therapy Session:     first follow up visit since initial evaluation -       initiated POC per flow sheet   Patient presents with pain over flexor carpi radialis, pronator teres and palmaris longus   Perform DTM and TPR to area f/b US 1.5 w/cm2 and then IFC with  to area  Patient was able to perform all exercises and activities without causing increase pain to the medial aspect of forearm/elbow     ASSESSMENT/Changes in Function:     Patient will continue to benefit from skilled PT services to modify and progress therapeutic interventions, address functional mobility deficits, address ROM deficits, address strength deficits and analyze and address soft tissue restrictions to attain remaining goals. [x]  See Plan of Care  []  See progress note/recertification  []  See Discharge Summary         Progress towards goals / Updated goals: · Short Term Goals: To be accomplished in  2  weeks:  1. Pt will be IND and compliant with HEP for self-management of symptoms. · Long Term Goals: To be accomplished in  4  weeks:  1. Patient will report pain at worst of 3/10 or less in L forearm to allow greater tolerance to full time duties at work. 2. Patient will report decreased frequency of numbness/tingling in L hand/fingers of 75% or more to facilitate greater ease with typing at work. 3. Patient will demonstrate symmetrical and painless wrist AROM to facilitate return to regular gym exercise.   4. Pt will improve FOTO score to at least 74/100 as a functional indicator of improved mobility.     PLAN  [x]  Upgrade activities as tolerated     [x]  Continue plan of care  []  Update interventions per flow sheet       []  Discharge due to:_  []  Other:_      Karlie Livingston PTA 7/12/2021  8:49 AM    Future Appointments   Date Time Provider Arthur Youssef   7/15/2021  7:00 AM Destiny Orozco DPT [facility]   7/19/2021  7:00 AM Manuel Colmenares PTA [facility]   7/23/2021  7:45 AM Manuel Colmenares PTA [facility]

## 2021-07-15 ENCOUNTER — HOSPITAL ENCOUNTER (OUTPATIENT)
Dept: PHYSICAL THERAPY | Age: 41
Discharge: HOME | End: 2021-07-15
Payer: COMMERCIAL

## 2021-07-15 PROCEDURE — 97140 MANUAL THERAPY 1/> REGIONS: CPT | Performed by: PHYSICAL THERAPIST

## 2021-07-15 PROCEDURE — 97110 THERAPEUTIC EXERCISES: CPT | Performed by: PHYSICAL THERAPIST

## 2021-07-15 PROCEDURE — 97014 ELECTRIC STIMULATION THERAPY: CPT | Performed by: PHYSICAL THERAPIST

## 2021-07-15 PROCEDURE — 97035 APP MDLTY 1+ULTRASOUND EA 15: CPT | Performed by: PHYSICAL THERAPIST

## 2021-07-15 NOTE — PROGRESS NOTES
PT DAILY TREATMENT NOTE     Patient Name: Stephanie Atwood  Date:7/15/2021  : 1980  [x]  Patient  Verified  Payor: BLUE CROSS / Plan: [employer] [facility] / Product Type: PPO /    In time: 700  Out time: 811  Total Treatment Time (min): 71  BC/BS 1:1 Time (min): 51  Visit #: 3 of     Treatment Area: Left forearm pain [M79.632]    SUBJECTIVE  Pain Level IN: (0-10 scale): 5/10   Pain Level OUT: (0-10 scale) post treatment: 1/10    Any medication changes, allergies to medications, adverse drug reactions, diagnosis change, or new procedure performed?: [x] No    [] Yes (see summary sheet for update)  Subjective functional status/changes:   [] No changes reported  Patient states that her L medial elbow was sore and tender to touch following previous visit. Patient reports completing her HEP. She states her pain at worst is 5/10 over the weekend.     OBJECTIVE    Modalities Rationale:     decrease edema, decrease inflammation, decrease pain and increase tissue extensibility to improve patient's ability to achieve all goals stated below  15 min [x] Estim, type/location:  IFC to the medial aspect of elbow with MH                                     []  att     [x]  unatt     []  w/US     []  w/ice    [x]  w/heat    min []  Mechanical Traction: type/lbs                   []  pro   []  sup   []  int   []  cont    []  before manual    []  after manual   8 min [x]  Ultrasound, settings/location:  Medial aspect of the elbow 1.5 w/cm2 50% pulsed     min []  Iontophoresis w/ dexamethasone, location:                                               []  take home patch       []  in clinic    min []  Ice     []  Heat    location/position:     min []  Vasopneumatic Device, press/temp:     min []  Other:    [] Skin assessment post-treatment (if applicable):    []  intact    []  redness- no adverse reaction     []redness  adverse reaction:           33 min Therapeutic Exercise:  [x] See flow sheet :  5 min NC Rationale: increase ROM and increase strength to improve the patients ability to use of forearm and hand with normal ADL without increase pain     15 min Manual Therapy: Technique:      [] S/DTM [x]IASTM []PROM [] Passive Stretching    [x]Manual TPR And DTM  Treatment/Area: lateral compartment of forearm over wrist extensors; Proximal forearm over wrist flexors   Rationale:      decrease pain, increase ROM, increase tissue extensibility and decrease trigger points to improve patient's ability to use the (L) UE and hand for normal ADL        With   [] TE   [] TA   [] neuro   [] other: Patient Education: [x] Review HEP    [] Progressed/Changed HEP based on:   [] positioning   [] body mechanics   [] transfers   [] heat/ice application    [] Other:           Objective/Functional Measures with Therapist Assessment Noted with Response to Therapy Session:   Added elbow flexion with dumbbell in supinated+neutral position and standing elbow extension with TB. Patient noted increased fatigue of R wrist flexors during curls but no increase in pain. Decreased stiffness of proximal wrist extensors noted following manual.      ASSESSMENT/Changes in Function: Patient noted moderate tenderness on medial aspect of elbow following previous session as expected with IASTM. Patient tolerates addition of new exercises today with moderate muscular fatigue of forearm flexors. Decreased pain report post-tx indicates effective overall intervention including manual, ultrasound, and therex. Patient would benefit from continued PT care to progress toward long term PT goals. Patient will continue to benefit from skilled PT services to modify and progress therapeutic interventions, address functional mobility deficits, address ROM deficits, address strength deficits and analyze and address soft tissue restrictions to attain remaining goals.      [x]  See Plan of Care  []  See progress note/recertification  []  See Discharge Summary Progress towards goals / Updated goals: · Short Term Goals: To be accomplished in  2  weeks:  1. Pt will be IND and compliant with HEP for self-management of symptoms. MET patient reports compliance 7/15/21  · Long Term Goals: To be accomplished in  4  weeks:  1. Patient will report pain at worst of 3/10 or less in L forearm to allow greater tolerance to full time duties at work. Progressing, patient reports 5/10 pain at worst. 7/15/21  2. Patient will report decreased frequency of numbness/tingling in L hand/fingers of 75% or more to facilitate greater ease with typing at work. 3. Patient will demonstrate symmetrical and painless wrist AROM to facilitate return to regular gym exercise.   4. Pt will improve FOTO score to at least 74/100 as a functional indicator of improved mobility.     PLAN  [x]  Upgrade activities as tolerated     [x]  Continue plan of care  []  Update interventions per flow sheet       []  Discharge due to:_  [x]  Other: Assess tolerance to added exercise and progress as tolerated      RENE Luo 7/15/2021  8:49 AM    Future Appointments   Date Time Provider Arthur Youssef   7/15/2021  7:00 AM Derick Faulkner DPT [facility]   7/19/2021  7:00 AM Estefany Jimenez PTA [facility]   7/23/2021  7:45 AM Derick Faulkner DPT [facility]

## 2021-07-16 NOTE — PROGRESS NOTES
Request for use of Dry Needling/Intramuscular Manual Therapy  Patient: Nelson Mahan     Referral Source: Hanane Gray MD  Diagnosis: Left forearm pain [M79.632]      : 1980  Date of initial visit: 21   Attended visits: 3  Missed Visits: 0    Based on findings from the physical therapy examination and evaluation, the evaluating therapist believes the patient, Christal Hermosillo  would benefit from including Dry Needling as part of the plan of care. Dry needling is a treatment technique utilized in conjunction with other PT interventions to inactivate myofascial trigger points and the pain and dysfunction they cause. Dry Needling is an advanced procedure that requires additional training including greater than 54 hours of intensive course work. Physical Therapists at [address] [place] [address] are certified through [address] [place] [address] courses for their education and are certified to perform treatments. PROCEDURE:   Solid filament sterile needle (typically 0.3mm/30 gauge) inserted into a trigger point   Repeated movements inactivate the trigger points, taking 30-60 seconds per site   Typically consists of 1 dry needling session per week and a possible second treatment including muscle re-education, flexibility, strengthening and other manual techniques to facilitate the benefits of dry needling     BENEFITS:   Inactivation of trigger points   Decreased pain   Increased muscle length   Improved movement patterns   Restoration of function POTENTIAL RISKS:   Post-needling soreness   Infection   Bruising/bleeding   Penetration of a nerve   Pneumothorax   All treating PTs have been thoroughly educated in avoiding adverse reactions    If you agree with this recommendation, please sign this form and fax it to us at [phone]. If you have questions or concerns regarding dry needling or any other treatment we may be providing, please contact us at [phone].     Thank you for allowing us to assist in the care of your patient. Cindra Boeck, DPT    7/16/2021 8:53 AM     NOTE TO PHYSICIAN:  PLEASE COMPLETE THE ORDERS BELOW AND   FAX TO [facility]: [phone]  If you are unable to process this request in 24 hours please contact our office:   [phone]    ? I have read the above request and AGREE to the recommendation of including dry needling as part of the plan of care. ? I have read the above request and DO NOT AGREE to including dry needling as part of the plan of care.   ? I have read the above report and request that my patient continue therapy with the following changes/special instructions:  ________________________________________________________________________    Physicians signature: _______________________________________________     Date: ______________Time:_______________

## 2021-07-19 ENCOUNTER — HOSPITAL ENCOUNTER (OUTPATIENT)
Dept: PHYSICAL THERAPY | Age: 41
Discharge: HOME | End: 2021-07-19
Payer: COMMERCIAL

## 2021-07-19 PROCEDURE — 97035 APP MDLTY 1+ULTRASOUND EA 15: CPT

## 2021-07-19 PROCEDURE — 97140 MANUAL THERAPY 1/> REGIONS: CPT

## 2021-07-19 PROCEDURE — 97110 THERAPEUTIC EXERCISES: CPT

## 2021-07-19 NOTE — PROGRESS NOTES
PT DAILY TREATMENT NOTE     Patient Name: Triston Story  Date:2021  : 1980  [x]  Patient  Verified  Payor: BLUE CROSS / Plan: [employer] [facility] / Product Type: PPO /    In time:7:00  Out time:8:00  Total Treatment Time (min): 60  BC/BS 1:1 Time (min) 55  Visit #: 4 of     Treatment Area: Left forearm pain [M79.972]    SUBJECTIVE  Pain Level IN: (0-10 scale): 4/10   Pain Level OUT: (0-10 scale) post treatment: 1/10    Any medication changes, allergies to medications, adverse drug reactions, diagnosis change, or new procedure performed?: [x] No    [] Yes (see summary sheet for update)  Subjective functional status/changes:   [] No changes reported  I am  over the inside of my elbow but I also feel it over to like my forearm and I still have tingling in my finger tips especially to 4th and 5th digit       OBJECTIVE    Modalities Rationale:     decrease edema, decrease inflammation, decrease pain and increase tissue extensibility to improve patient's ability to achieve all goals stated below   min [] Estim, type/location:  IFC to the medial aspect of elbow with MH                                     []  att     [x]  unatt     []  w/US     []  w/ice    [x]  w/heat    min []  Mechanical Traction: type/lbs                   []  pro   []  sup   []  int   []  cont    []  before manual    []  after manual   8 min [x]  Ultrasound, settings/location:  Medial aspect of the elbow  And forearm 1.5 w/cm2 continue      min []  Iontophoresis w/ dexamethasone, location:                                               []  take home patch       []  in clinic    min []  Ice     []  Heat    location/position:     min []  Vasopneumatic Device, press/temp:     min []  Other:    [] Skin assessment post-treatment (if applicable):    []  intact    []  redness- no adverse reaction     []redness  adverse reaction:             37/32 min Therapeutic Exercise:  [x] See flow sheet :    5 min NC Rationale: increase ROM and increase strength to improve the patients ability to use of forearm and hand with normal ADL without increase pain     15 min Manual Therapy: Technique:      [] S/DTM []IASTM []PROM [] Passive Stretching   [] Graston:  [] GT 1  [] GT 2 [] GT 3 [] GT 4 [] GT 4 [] GT 5  [] GT 6  [] Sweep [] Fan [] Needles  [] Brush   []  Swivel []J- Stroke [] Scoop []IFraming     [x]Manual TPR  And DTM  []Jt manipulation:Gr I [] II []  III [] IV[] V[]  Treatment/Area: flexor carpi radialis, pronator teres and palmaris longus, extensor wad    Rationale:      decrease pain, increase ROM, increase tissue extensibility and decrease trigger points to improve patient's ability to use the (L) UE and hand for normal ADL        With   [] TE   [] TA   [] neuro   [] other: Patient Education: [x] Review HEP    [] Progressed/Changed HEP based on:   [] positioning   [] body mechanics   [] transfers   [] heat/ice application    [] Graston Education: Explained the effects and benefits of Graston Technique therapy including potential for post treatment soreness and bruising. [] Other:           Objective/Functional Measures with Therapist Assessment Noted with Response to Therapy Session:     Decreased trigger point to the medial aspect of elbow however did present with trigger point to extensor wad - after manual to area and continuous US to extensor wad - patient is reported decrease tingling to the 4th/5th digit   Patient was able to perform all exercises without pain to the medial aspect of the elbow and to the forearm    ASSESSMENT/Changes in Function:     Patient will continue to benefit from skilled PT services to modify and progress therapeutic interventions, address functional mobility deficits, address ROM deficits, address strength deficits and analyze and address soft tissue restrictions to attain remaining goals.      [x]  See Plan of Care  []  See progress note/recertification  []  See Discharge Summary Progress towards goals / Updated goals: · Short Term Goals: To be accomplished in  2  weeks:  1. Pt will be IND and compliant with HEP for self-management of symptoms. · Long Term Goals: To be accomplished in  4  weeks:  1. Patient will report pain at worst of 3/10 or less in L forearm to allow greater tolerance to full time duties at work. 2. Patient will report decreased frequency of numbness/tingling in L hand/fingers of 75% or more to facilitate greater ease with typing at work. 3. Patient will demonstrate symmetrical and painless wrist AROM to facilitate return to regular gym exercise.   4. Pt will improve FOTO score to at least 74/100 as a functional indicator of improved mobility.     PLAN  [x]  Upgrade activities as tolerated     [x]  Continue plan of care  []  Update interventions per flow sheet       []  Discharge due to:_  []  Other:_      Karlie Livingston PTA 7/19/2021  8:49 AM    Future Appointments   Date Time Provider Arthur Youssef   7/23/2021  7:45 AM Destiny Orozco DPT [facility]

## 2021-07-23 ENCOUNTER — HOSPITAL ENCOUNTER (OUTPATIENT)
Dept: PHYSICAL THERAPY | Age: 41
Discharge: HOME | End: 2021-07-23
Payer: COMMERCIAL

## 2021-07-23 PROCEDURE — 97035 APP MDLTY 1+ULTRASOUND EA 15: CPT | Performed by: PHYSICAL THERAPIST

## 2021-07-23 PROCEDURE — 97016 VASOPNEUMATIC DEVICE THERAPY: CPT | Performed by: PHYSICAL THERAPIST

## 2021-07-23 PROCEDURE — 97140 MANUAL THERAPY 1/> REGIONS: CPT | Performed by: PHYSICAL THERAPIST

## 2021-07-23 PROCEDURE — 97110 THERAPEUTIC EXERCISES: CPT | Performed by: PHYSICAL THERAPIST

## 2021-07-23 PROCEDURE — 97014 ELECTRIC STIMULATION THERAPY: CPT | Performed by: PHYSICAL THERAPIST

## 2021-07-23 NOTE — PROGRESS NOTES
PT DAILY TREATMENT NOTE     Patient Name: Trudy Garza  Date:2021  : 1980  [x]  Patient  Verified  Payor: BLUE CROSS / Plan: [employer] [facility] / Product Type: PPO /    In time: 745  Out time:900  Total Treatment Time (min): 75  BC/BS 1:1 Time (min) 60  Visit #: 5 of     Treatment Area: Left forearm pain [M79.002]    SUBJECTIVE  Pain Level IN: (0-10 scale): 2/10   Pain Level OUT: (0-10 scale) post treatment: 10    Any medication changes, allergies to medications, adverse drug reactions, diagnosis change, or new procedure performed?: [x] No    [] Yes (see summary sheet for update)  Subjective functional status/changes:   [] No changes reported  Pt reports that her symptoms are improved.        OBJECTIVE    Modalities Rationale:     decrease edema, decrease inflammation, decrease pain and increase tissue extensibility to improve patient's ability to achieve all goals stated below  15 min [] Estim, type/location:  IFC to the medial aspect of elbow                                      []  att     [x]  unatt     []  w/US     [x]  w/ice    []  w/heat    min []  Mechanical Traction: type/lbs                   []  pro   []  sup   []  int   []  cont    []  before manual    []  after manual   8 min [x]  Ultrasound, settings/location:  Medial aspect of the elbow  And forearm 1.5 w/cm2 continue      min []  Iontophoresis w/ dexamethasone, location:                                               []  take home patch       []  in clinic    min []  Ice     []  Heat    location/position:     min []  Vasopneumatic Device, press/temp:     min []  Other:    [] Skin assessment post-treatment (if applicable):    []  intact    []  redness- no adverse reaction     []redness  adverse reaction:        37 min Therapeutic Exercise:  [x] See flow sheet :    5 min NC    Rationale: increase ROM and increase strength to improve the patients ability to use of forearm and hand with normal ADL without increase pain 15 min Manual Therapy: Technique:      [] S/DTM []IASTM []PROM [] Passive Stretching   [] Graston:  [] GT 1  [] GT 2 [] GT 3 [] GT 4 [] GT 4 [] GT 5  [] GT 6  [] Sweep [] Fan [] [place]  [] Brush   []  Swivel []J- Stroke [] Scoop []IFraming     [x]Manual TPR  And DTM  []Jt manipulation:Gr I [] II []  III [] IV[] V[]  Treatment/Area: flexor carpi radialis, pronator teres and palmaris longus, extensor wad    Rationale:      decrease pain, increase ROM, increase tissue extensibility and decrease trigger points to improve patient's ability to use the (L) UE and hand for normal ADL      With   [] TE   [] TA   [] neuro   [] other: Patient Education: [x] Review HEP    [] Progressed/Changed HEP based on:   [] positioning   [] body mechanics   [] transfers   [] heat/ice application    [] Graston Education: Explained the effects and benefits of Graston Technique therapy including potential for post treatment soreness and bruising. [] Other:      Objective/Functional Measures:       ASSESSMENT/Changes in Function:   Continued muscle restrictions noted with manual interventions leading to pain. She reports pain on the medial aspect of the L elbow with full elbow extension. Overall improvements in pain progressing towards long-term goals. Continue to progress as tolerated. Awaiting IMT order. Patient will continue to benefit from skilled PT services to modify and progress therapeutic interventions, address functional mobility deficits, address ROM deficits, address strength deficits and analyze and address soft tissue restrictions to attain remaining goals. [x]  See Plan of Care  []  See progress note/recertification  []  See Discharge Summary         Progress towards goals / Updated goals: · Short Term Goals: To be accomplished in  2  weeks:  1. Pt will be IND and compliant with HEP for self-management of symptoms. MET patient reports compliance 7/15/21  · Long Term Goals: To be accomplished in  4  weeks:  1.  Patient will report pain at worst of 3/10 or less in L forearm to allow greater tolerance to full time duties at work. Progressing, patient reports 3/10 pain at worst. 7/23/21  2. Patient will report decreased frequency of numbness/tingling in L hand/fingers of 75% or more to facilitate greater ease with typing at work. 3. Patient will demonstrate symmetrical and painless wrist AROM to facilitate return to regular gym exercise.   4. Pt will improve FOTO score to at least 74/100 as a functional indicator of improved mobility.     PLAN  [x]  Upgrade activities as tolerated     [x]  Continue plan of care  []  Update interventions per flow sheet       []  Discharge due to:_  []  Other:_      Emile Wheeler DPT 7/23/2021  907  AM    Future Appointments   Date Time Provider Arthur Youssef   7/26/2021  7:45 AM Gerda Robles PTA [facility]   7/29/2021  7:00 AM Alpa Nick DPT [facility]

## 2021-07-26 ENCOUNTER — APPOINTMENT (OUTPATIENT)
Dept: PHYSICAL THERAPY | Age: 41
End: 2021-07-26
Payer: COMMERCIAL

## 2021-07-27 ENCOUNTER — HOSPITAL ENCOUNTER (OUTPATIENT)
Dept: PHYSICAL THERAPY | Age: 41
Discharge: HOME | End: 2021-07-27
Payer: COMMERCIAL

## 2021-07-27 PROCEDURE — 97035 APP MDLTY 1+ULTRASOUND EA 15: CPT | Performed by: PHYSICAL THERAPIST

## 2021-07-27 PROCEDURE — 97140 MANUAL THERAPY 1/> REGIONS: CPT | Performed by: PHYSICAL THERAPIST

## 2021-07-27 PROCEDURE — 20560 NDL INSJ W/O NJX 1 OR 2 MUSC: CPT | Performed by: PHYSICAL THERAPIST

## 2021-07-27 NOTE — PROGRESS NOTES
PT DAILY TREATMENT NOTE - [facility]     Patient Name: Beatrice Thurman  Date:2021  : 1980  [x]  Patient  Verified  Payor: BLUE CROSS / Plan: [employer] [facility] [place] / Product Type: PPO /    In time:743  Out time:823  Total Treatment Time (min): 40  Total Timed Codes (min): 35  1:1 Treatment Time ( only): 40   Visit #: 6 of     Treatment Area: Left forearm pain [M18.933]    SUBJECTIVE  Pain Level (0-10 scale): 1/10  Any medication changes, allergies to medications, adverse drug reactions, diagnosis change, or new procedure performed?: [x] No    [] Yes (see summary sheet for update)  Subjective functional status/changes:   [] No changes reported  Pt reports that she has been feeling better. She was disappointed to know her strength on the L elbow has decreased.      OBJECTIVE    Modality rationale: decrease pain and increase tissue extensibility to improve the patients ability to improve post IMT soreness    Min Type Additional Details   TC [] Estim: []Att   []Unatt        []TENS instruct                  []IFC  []Premod   []NMES                     []Other:  []w/US   []w/ice   []w/heat  Position:  Location:    []  Traction: [] Cervical       []Lumbar                       [] Prone          []Supine                       []Intermittent   []Continuous Lbs:  [] before manual  [] after manual   8 [x]  Ultrasound: [x]Continuous   [] Pulsed                           [x]1MHz   []3MHz Location: L wrist extensor wad  W/cm2: .8    []  Iontophoresis with dexamethasone         Location: [] Take home patch   [] In clinic    []  Ice     []  heat  []  Ice massage Position:  Location:    []  Laser  []  Other: Position:  Location:    []  Vasopneumatic Device Pressure:       [] lo [] med [] hi   Temperature: [] lo [] med [] hi   [] Skin assessment post-treatment:  []intact []redness- no adverse reaction    []redness  adverse reaction:     23 min Manual Therapy: Technique:      [x] S/DTM []IASTM []PROM [] Passive Stretching   [] Graston:  [] GT 1  [] GT 2 [] GT 3 [] GT 4 [] GT 4 [] GT 5  [] GT 6  [] Sweep [] Fan [] [place]  [] Brush   []  Swivel []J- Stroke [] Scoop []IFraming     []Manual TPR  [] TDN (see below)  []Jt manipulation:Gr I [] II []  III [] IV[] V[]  Treatment/Area:  L extensor wad     CPT 66488:  needle insertion(s) without injection(s); 1 or 2 muscle(s)    5 min   Rationale:      decrease pain, increase ROM, increase tissue extensibility and decrease trigger points to improve patient's ability to improve return to weight lifting            With   [] TE   [] TA   [] neuro   [] other: Patient Education: [x] Review HEP    [] Progressed/Changed HEP based on:   [] positioning   [] body mechanics   [] transfers   [] heat/ice application    [] Graston Education: Explained the effects and benefits of Graston Technique therapy including potential for post treatment soreness and bruising. [] Other:      Dry Needling Procedure Note    Dry Needle Session Number:  1    Procedure: An intramuscular manual therapy (dry needling) and a neuro-muscular re-education treatment was done to deactivate myofascial trigger points, with a 15/30 gauge solid filament needle, under aseptic technique. Indication(s): [x] Muscle spasms [] Myalgia/Myositis  [] Muscle cramps      [x] Muscle imbalances [] TMD (TMJ) [] Myofascial pain & dysfunction     [] Other: __    Chart reviewed for the following:  ITiffany DPT, have reviewed the medical history, summary list and precautions/contraindications for [employer].     TIME OUT performed immediately prior to start of procedure:  815 AM (enter time the timeout was completed)  Tiffany CHILD DPT, have performed the following reviews on [employer] prior to the start of the session:      [x] Patient was identified by name and date of birth    [x] Agreement on all muscles being treated was verified   [x] Purpose of dry needling, side effects, possible complications, risks and benefits were explained to the patient   [x] Procedure site(s) verified  [x] Patient was positioned for comfort and draped for privacy  [x] Informed Consent was signed (initial visit) and verified verbally (subsequent visits)  [x] Patient was instructed on the need to report the use of blood thinners and/or immunosuppressant medications  [x] How to respond to possible adverse effects of treatment  [x] Self treatment of post needling soreness: ice, heat (moist heat, heat wraps) and stretching  [x] Opportunity was given to ask any questions, all questions were answered            Treatment:  The following muscles were treated today:    Right:    Left: Wrist extensor wad     Patients response to todays treatment:   [x]  LTRs  [x]  Muscle Relaxation  []  Pain Relief  []  Decreased Tinnitus  []  Decreased HAs [x]  Post needling soreness []  Increased ROM   []  Other:      Other Objective/Functional Measures:        Pain Level (0-10 scale) post treatment: sore    ASSESSMENT/Changes in Function:   + response in above needled muscles. Held therex due to [facility] as patient needed to leave appt early. Assess effects next treatment. Patient will continue to benefit from skilled PT services to modify and progress therapeutic interventions, address functional mobility deficits, address ROM deficits, address strength deficits, analyze and address soft tissue restrictions and address imbalance/dizziness to attain remaining goals. []  See Plan of Care  []  See progress note/recertification  []  See Discharge Summary         Progress towards goals / Updated goals: · Short Term Goals: To be accomplished in  2  weeks:  1. Pt will be IND and compliant with HEP for self-management of symptoms.  MET patient reports compliance 7/15/21  · Long Term Goals: To be accomplished in  4  weeks:  1.  Patient will report pain at worst of 3/10 or less in L forearm to allow greater tolerance to full time duties at [employer], patient reports 3/10 pain at worst. 7/23/21  2. Patient will report decreased frequency of numbness/tingling in L hand/fingers of 75% or more to facilitate greater ease with typing at work. 3. Patient will demonstrate symmetrical and painless wrist AROM to facilitate return to regular gym exercise.   4. Pt will improve FOTO score to at least 74/100 as a functional indicator of improved mobility.     PLAN  []  Upgrade activities as tolerated     [x]  Continue plan of care  []  Update interventions per flow sheet       []  Discharge due to:_  [x]  Other: Check goals NV     Romeo Copeland DPT 7/27/2021  904  AM

## 2021-07-29 ENCOUNTER — APPOINTMENT (OUTPATIENT)
Dept: PHYSICAL THERAPY | Age: 41
End: 2021-07-29
Payer: COMMERCIAL

## 2021-08-09 ENCOUNTER — HOSPITAL ENCOUNTER (OUTPATIENT)
Dept: PHYSICAL THERAPY | Age: 41
Discharge: HOME | End: 2021-08-09
Payer: COMMERCIAL

## 2021-08-09 PROCEDURE — 97110 THERAPEUTIC EXERCISES: CPT | Performed by: PHYSICAL THERAPIST

## 2021-08-09 PROCEDURE — 97140 MANUAL THERAPY 1/> REGIONS: CPT | Performed by: PHYSICAL THERAPIST

## 2021-08-09 NOTE — PROGRESS NOTES
PT DAILY TREATMENT NOTE     Patient Name: Larry Courtney  Date:2021  : 1980  [x]  Patient  Verified  Payor: BLUE CROSS / Plan: [facility] [place] / Product Type: PPO /    In time: 805  Out time:920  Total Treatment Time (min): 46  BC/BS 1:1 Time (min) 46  Visit #: 7 of     Treatment Area: Left forearm pain [M79.782]    SUBJECTIVE  Pain Level IN: (0-10 scale): 0/10   Pain Level OUT: (0-10 scale) post treatment: 0/10    Any medication changes, allergies to medications, adverse drug reactions, diagnosis change, or new procedure performed?: [x] No    [] Yes (see summary sheet for update)  Subjective functional status/changes:   [] No changes reported  Pt reports 95% improvement in symptoms since SOC. She reports reduction in pain to 0/10, no numbness and tingling, and no stiffness. Functional limitations: include lifting as she has not returned to cross fit at this time.        OBJECTIVE    Modalities Rationale:     decrease edema, decrease inflammation, decrease pain and increase tissue extensibility to improve patient's ability to achieve all goals stated below  TC min [] Estim, type/location:  IFC to the medial aspect of elbow                                      []  att     [x]  unatt     []  w/US     [x]  w/ice    []  w/heat    min []  Mechanical Traction: type/lbs                   []  pro   []  sup   []  int   []  cont    []  before manual    []  after manual   TC min [x]  Ultrasound, settings/location:  Medial aspect of the elbow  And forearm 1.5 w/cm2 continue      min []  Iontophoresis w/ dexamethasone, location:                                               []  take home patch       []  in clinic    min []  Ice     []  Heat    location/position:     min []  Vasopneumatic Device, press/temp:     min []  Other:    [] Skin assessment post-treatment (if applicable):    []  intact    []  redness- no adverse reaction     []redness  adverse reaction:        36 min Therapeutic Exercise: [x] See flow sheet :    5 min NC    Rationale: increase ROM and increase strength to improve the patients ability to use of forearm and hand with normal ADL without increase pain     10 min Manual Therapy: Technique:      [] S/DTM []IASTM []PROM [] Passive Stretching   [] Graston:  [] GT 1  [] GT 2 [] GT 3 [] GT 4 [] GT 4 [] GT 5  [] GT 6  [] Sweep [] Fan [] [place]  [] Brush   []  Swivel []J- Stroke [] Scoop []IFraming     [x]Manual TPR  And DTM  []Jt manipulation:Gr I [] II []  III [] IV[] V[]  Treatment/Area: flexor carpi radialis, pronator teres and palmaris longus, extensor wad    Rationale:      decrease pain, increase ROM, increase tissue extensibility and decrease trigger points to improve patient's ability to use the (L) UE and hand for normal ADL      With   [] TE   [] TA   [] neuro   [] other: Patient Education: [x] Review HEP    [] Progressed/Changed HEP based on:   [] positioning   [] body mechanics   [] transfers   [] heat/ice application    [] Graston Education: Explained the effects and benefits of Graston Technique therapy including potential for post treatment soreness and bruising. [] Other:      Objective/Functional Measures: FOTO: increased to 76/100 from 60/100  AROM of the wrist flexion/extension: 85 deg    strength: R: 83.8#, L: 80.6#  L wrist strength: 5/5  Noted tenderness in the L proximal wrist extensor wad      ASSESSMENT/Changes in Function:   Upon reassessment, pt presents without pain, improved AROM of the L wrist, improved  strength, and reduced radicular symptoms since SOC. Mild tightness in the L wrist extensor wad. At this time pt is progressing towards all long-term goals however, has not returned to lifting at [address] [place] [address]. Pt to return tomorrow and will continue to assess if IMT is needed. It is recommended that patient decrease frequency to 1 time a week to progress towards independent management.    Patient will continue to benefit from skilled PT services to modify and progress therapeutic interventions, address functional mobility deficits, address ROM deficits, address strength deficits and analyze and address soft tissue restrictions to attain remaining goals. [x]  See Plan of Care  []  See progress note/recertification  []  See Discharge Summary         Progress towards goals / Updated goals: · Short Term Goals: To be accomplished in  2  weeks:  1. Pt will be IND and compliant with HEP for self-management of symptoms. MET patient reports compliance 7/15/21  · Long Term Goals: To be accomplished in  4  weeks:  1. Patient will report pain at worst of 3/10 or less in L forearm to allow greater tolerance to full time duties at work. met, 0/10 8/9/21  2. Patient will report decreased frequency of numbness/tingling in L hand/fingers of 75% or more to facilitate greater ease with typing at work. Met 100% gone 8/9/21  3. Patient will demonstrate symmetrical and painless wrist AROM to facilitate return to regular gym exercise. Met: 8/9/21  4. Pt will improve FOTO score to at least 74/100 as a functional indicator of improved mobility.  met 76/100 8/9/21    PLAN  [x]  Upgrade activities as tolerated     [x]  Continue plan of care  []  Update interventions per flow sheet       []  Discharge due to:_  [x]  Other:Continue PT 1-2x/week for 4 weeks to progress towards IND management.        Giana Lopez DPT 8/9/2021  901  AM    Future Appointments   Date Time Provider Arthur Youssef   8/11/2021  5:30 PM Nicole Wen DPT [facility]   8/16/2021  5:15 PM Nicole Wen DPT [facility]   8/18/2021  5:30 PM Nicole Wen DPT [facility]   8/23/2021  7:00 AM Kalie Portillo PTA [facility]   8/24/2021  7:00 AM Kalie Portillo PTA [facility]

## 2021-08-09 NOTE — PROGRESS NOTES
[address] PHYSICAL THERAPY AT [facility] [address], [place] g[place], [place]  Phone [phone]  Fax [phone]  PROGRESS NOTE  Patient Name: Trudy Garza : 1980   Treatment/Medical Diagnosis: Left forearm pain [M79.632]   Referral Source: Ellis Goodwin MD     Date of Initial Visit: 21 Attended Visits: 7 Missed Visits: 0     SUMMARY OF TREATMENT  Pt seen for 7 visits for L forearm pain. Therapy included therex for strengthening/postural stability, manual/IMT for tissue extensibility, and modalities for pain management. CURRENT STATUS  Pt reports 95% improvement in symptoms since [facility]. She reports reduction in pain to 0/10, no numbness and tingling, and no stiffness. Functional limitations: include lifting as she has not returned to cross fit at this time. Upon reassessment, pt presents without pain, improved AROM of the L wrist, improved  strength, and reduced radicular symptoms since SOC. Mild tightness in the L wrist extensor wad. At this time pt is progressing towards all long-term goals however, has not returned to lifting at [address] [place] [address]. Pt to return tomorrow and will continue to assess if IMT is needed. It is recommended that patient decrease frequency to 1 time a week to progress towards independent management. Objective/Functional Measures: FOTO: increased to 76/100 from 60/100  AROM of the wrist flexion/extension: 85 deg    strength: R: 83.8#, L: 80.6#  L wrist strength: 5/5 in all planes    MEASUREMENTS TAKEN AT EVALUATION ON 21    WFL N/A Flex Ext Sup Pro RD UD Pain   Left Elbow -  - WNL WNL N/A N/A N/A N/A - Yes   - No   Right Elbow - - WNL WNL N/A N/A N/A N/A - Yes   - No   Left Wrist []?  []?  84deg 61deg p! 71deg 79deg 29deg 32deg [x]? Yes   []? No   Right Wrist [x]?  []?  81deg 60deg 82deg 89deg 30deg 28deg []? Yes   [x]? No           WNL N/A Flex Ext Pain   Left Elbow []?  []?  4+/5 p! 4+/5 [x]? Yes   []?  No   Right Elbow [x]?  []?  5/5 5/5 []? Yes   []? No          Goal/Measure of Progress Goal Met? · Short Term Goals: To be accomplished in  2  weeks:  1. Pt will be IND and compliant with HEP for self-management of symptoms.  MET patient reports compliance 7/15/21  · Long Term Goals: To be accomplished in  4  weeks:  1. Patient will report pain at worst of 3/10 or less in L forearm to allow greater tolerance to full time duties at [address], 0/10 8/9/21  2. Patient will report decreased frequency of numbness/tingling in L hand/fingers of 75% or more to facilitate greater ease with typing at work. Met 100% gone 8/9/21  3. Patient will demonstrate symmetrical and painless wrist AROM to facilitate return to regular gym exercise. Met: 8/9/21  4. Pt will improve FOTO score to at least 74/100 as a functional indicator of improved mobility.  met 76/100 8/9/21    New Goals to be achieved in __4__  weeks:  1. Pt will to perform a 100# press and clean movement without pain to return to PLOF with Cross fit. 2. Pt will be IND and compliant with updated HEP to return to PLOF symptom free in prep for DC in 4 weeks. RECOMMENDATIONS  Continue PT 1-2x/week for 4 weeks to progress towards IND management. If you have any questions/comments please contact us directly at [phone]. Thank you for allowing us to assist in the care of your patient. Therapist Signature: Giana Lopez DPT Date: 8/9/2021     Time: 9:02 AM   NOTE TO PHYSICIAN:  PLEASE COMPLETE THE ORDERS BELOW AND FAX TO   [facility]: [phone].   If you are unable to process this request in 24 hours please contact our office: [phone].    ___ I have read the above report and request that my patient continue as recommended.   ___ I have read the above report and request that my patient continue therapy with the following changes/special instructions:_________________________________________________________   ___ I have read the above report and request that my patient be discharged from therapy.      Physician Signature:        Date:       Time:    Jennifer Herrera MD

## 2021-08-11 ENCOUNTER — HOSPITAL ENCOUNTER (OUTPATIENT)
Dept: PHYSICAL THERAPY | Age: 41
Discharge: HOME | End: 2021-08-11
Payer: COMMERCIAL

## 2021-08-11 PROCEDURE — 97110 THERAPEUTIC EXERCISES: CPT | Performed by: PHYSICAL THERAPIST

## 2021-08-11 PROCEDURE — 97140 MANUAL THERAPY 1/> REGIONS: CPT | Performed by: PHYSICAL THERAPIST

## 2021-08-11 NOTE — PROGRESS NOTES
PT DAILY TREATMENT NOTE     Patient Name: Triston Story  Date:2021  : 1980  [x]  Patient  Verified  Payor: BLUE CROSS / Plan: [facility] [place] / Product Type: PPO /    In time: 528  Out time:625  Total Treatment Time (min): 57  BC/BS 1:1 Time (min) 57  Visit #: 1 of 4-8    Treatment Area: Left forearm pain [M79.632]    SUBJECTIVE  Pain Level IN: (0-10 scale): 0/10   Pain Level OUT: (0-10 scale) post treatment: 0/10    Any medication changes, allergies to medications, adverse drug reactions, diagnosis change, or new procedure performed?: [x] No    [] Yes (see summary sheet for update)  Subjective functional status/changes:   [] No changes reported  Pt reports weight-lifting 25# without a problem yesterday.       OBJECTIVE    Modalities Rationale:     decrease edema, decrease inflammation, decrease pain and increase tissue extensibility to improve patient's ability to achieve all goals stated below  PD min [] Estim, type/location:  IFC to the medial aspect of elbow                                      []  att     [x]  unatt     []  w/US     [x]  w/ice    []  w/heat    min []  Mechanical Traction: type/lbs                   []  pro   []  sup   []  int   []  cont    []  before manual    []  after manual   TC min [x]  Ultrasound, settings/location:  Medial aspect of the elbow  And forearm 1.5 w/cm2 continue      min []  Iontophoresis w/ dexamethasone, location:                                               []  take home patch       []  in clinic    min []  Ice     []  Heat    location/position:     min []  Vasopneumatic Device, press/temp:     min []  Other:    [] Skin assessment post-treatment (if applicable):    []  intact    []  redness- no adverse reaction     []redness  adverse reaction:        42 min Therapeutic Exercise:  [x] See flow sheet :       Rationale: increase ROM and increase strength to improve the patients ability to use of forearm and hand with normal ADL without increase pain     15 min Manual Therapy: Technique:      [] S/DTM []IASTM []PROM [] Passive Stretching   [] Graston:  [] GT 1  [] GT 2 [] GT 3 [] GT 4 [] GT 4 [] GT 5  [] GT 6  [] Sweep [] Fan [] [place]  [] Brush   []  Swivel []J- Stroke [] Scoop []IFraming     [x]Manual TPR  And DTM  []Jt manipulation:Gr I [] II []  III [] IV[] V[]  Treatment/Area: flexor carpi radialis, pronator teres and palmaris longus, extensor wad    Rationale:      decrease pain, increase ROM, increase tissue extensibility and decrease trigger points to improve patient's ability to use the (L) UE and hand for normal ADL      With   [] TE   [] TA   [] neuro   [] other: Patient Education: [x] Review HEP    [] Progressed/Changed HEP based on:   [] positioning   [] body mechanics   [] transfers   [] heat/ice application    [] Graston Education: Explained the effects and benefits of Graston Technique therapy including potential for post treatment soreness and bruising. [] Other:      Objective/Functional Measures:  Noted tenderness in the L proximal wrist extensor wad-reduced form last session      ASSESSMENT/Changes in Function:   Pt tolerated all therex without increased pain. She was able to perform heavy weight-lifting at cross fit without restrictions. IMT not indicated this session as no palpable trigger points were noted. Continue to progress as tolerated. Pt educated to increased weight at gym and see how she does. Patient will continue to benefit from skilled PT services to modify and progress therapeutic interventions, address functional mobility deficits, address ROM deficits, address strength deficits and analyze and address soft tissue restrictions to attain remaining goals. [x]  See Plan of Care  []  See progress note/recertification  []  See Discharge Summary         Progress towards goals / Updated goals:  1. Pt will to perform a 100# press and clean movement without pain to return to PLOF with Cross fit.   2. Pt will be IND and compliant with updated HEP to return to PLOF symptom free in prep for DC in 4 weeks.     PLAN  [x]  Upgrade activities as tolerated     [x]  Continue plan of care  []  Update interventions per flow sheet       []  Discharge due to:_  [x]  Other:check goal [address] [facility] 8/11/2021  901  AM    Future Appointments   Date Time Provider Arthur Youssef   8/16/2021  5:15 PM Gina Eller DPT [facility]   8/18/2021  5:30 PM Gina Eller DPT [facility]   8/23/2021  7:00 AM Perla Garza PTA [facility]   8/24/2021  7:00 AM Perla Garza PTA [facility]

## 2021-08-16 ENCOUNTER — HOSPITAL ENCOUNTER (OUTPATIENT)
Dept: PHYSICAL THERAPY | Age: 41
Discharge: HOME | End: 2021-08-16
Payer: COMMERCIAL

## 2021-08-16 PROCEDURE — 97110 THERAPEUTIC EXERCISES: CPT | Performed by: PHYSICAL THERAPIST

## 2021-08-16 NOTE — PROGRESS NOTES
PT DAILY TREATMENT NOTE     Patient Name: Jamie Garcia  Date:2021  : 1980  [x]  Patient  Verified  Payor: BLUE CROSS / Plan: [employer] [facility] / Product Type: PPO /    In time: 518  Out time 600  Total Treatment Time (min): 42  BC/BS 1:1 Time (min) 25  Visit #: 2 of 4-8    Treatment Area: Left forearm pain [M79.482]    SUBJECTIVE  Pain Level IN: (0-10 scale): 0/10   Pain Level OUT: (0-10 scale) post treatment: 0/10    Any medication changes, allergies to medications, adverse drug reactions, diagnosis change, or new procedure performed?: [x] No    [] Yes (see summary sheet for update)  Subjective functional status/changes:   [] No changes reported  Pt reports 100% improvement since SOC. She reports that she was able to return to all weight-lifting activities 50-80# over the weekend without issues. She reports that she feels like she is able to return to all activities and would like to be placed on hold.        OBJECTIVE    Modalities Rationale:     decrease edema, decrease inflammation, decrease pain and increase tissue extensibility to improve patient's ability to achieve all goals stated below  PD min [] Estim, type/location:  IFC to the medial aspect of elbow                                      []  att     [x]  unatt     []  w/US     [x]  w/ice    []  w/heat    min []  Mechanical Traction: type/lbs                   []  pro   []  sup   []  int   []  cont    []  before manual    []  after manual   TC min [x]  Ultrasound, settings/location:  Medial aspect of the elbow  And forearm 1.5 w/cm2 continue      min []  Iontophoresis w/ dexamethasone, location:                                               []  take home patch       []  in clinic    min []  Ice     []  Heat    location/position:     min []  Vasopneumatic Device, press/temp:     min []  Other:    [] Skin assessment post-treatment (if applicable):    []  intact    []  redness- no adverse reaction     []redness  adverse reaction: 42 min Therapeutic Exercise:  [x] See flow sheet :       Rationale: increase ROM and increase strength to improve the patients ability to use of forearm and hand with normal ADL without increase pain     H min Manual Therapy: Technique:      [] S/DTM []IASTM []PROM [] Passive Stretching   [] Graston:  [] GT 1  [] GT 2 [] GT 3 [] GT 4 [] GT 4 [] GT 5  [] GT 6  [] Sweep [] Fan [] Skidmore  [] Brush   []  Swivel []J- Stroke [] Scoop []IFraming     [x]Manual TPR  And DTM  []Jt manipulation:Gr I [] II []  III [] IV[] V[]  Treatment/Area: flexor carpi radialis, pronator teres and palmaris longus, extensor wad    Rationale:      decrease pain, increase ROM, increase tissue extensibility and decrease trigger points to improve patient's ability to use the (L) UE and hand for normal ADL      With   [] TE   [] TA   [] neuro   [] other: Patient Education: [x] Review HEP    [] Progressed/Changed HEP based on:   [] positioning   [] body mechanics   [] transfers   [] heat/ice application    [] Graston Education: Explained the effects and benefits of Graston Technique therapy including potential for post treatment soreness and bruising. [] Other:      Objective/Functional Measures:      ASSESSMENT/Changes in Function:   Pt reports that she is 100% back to normal and would like to placed on 30 day hold to trial IND management. She demonstrated ability to perform press/clean movements with 100# without increased pain. She has met/progressing towards all goals and therefore will return in 30 days for FU. Patient will continue to benefit from skilled PT services to modify and progress therapeutic interventions, address functional mobility deficits, address ROM deficits, address strength deficits and analyze and address soft tissue restrictions to attain remaining goals. [x]  See Plan of Care  []  See progress note/recertification  []  See Discharge Summary         Progress towards goals / Updated goals:  1.  Pt will to perform a 100# press and clean movement without pain to return to PLOF with Cross fit. Met 8/16/21  2. Pt will be IND and compliant with updated HEP to return to PLOF symptom free in prep for DC in 4 weeks.  Met 8/16/21    PLAN  [x]  Upgrade activities as tolerated     [x]  Continue plan of care  []  Update interventions per flow sheet       []  Discharge due to:_  [x]  Other:Pt request 30 day hold to trial IND management     Tiffany Melchor DPT 8/16/2021  648 PM    Future Appointments   Date Time Provider Arthur Youssef   8/18/2021  5:30 PM Jose Puente DPT [facility]   8/23/2021  7:00 AM Quinten [facility], PTA [facility]   8/24/2021  7:00 AM Mosetta Forth, PTA [facility]

## 2021-08-18 ENCOUNTER — APPOINTMENT (OUTPATIENT)
Dept: PHYSICAL THERAPY | Age: 41
End: 2021-08-18
Payer: COMMERCIAL

## 2021-08-23 ENCOUNTER — APPOINTMENT (OUTPATIENT)
Dept: PHYSICAL THERAPY | Age: 41
End: 2021-08-23
Payer: COMMERCIAL

## 2021-08-24 ENCOUNTER — APPOINTMENT (OUTPATIENT)
Dept: PHYSICAL THERAPY | Age: 41
End: 2021-08-24
Payer: COMMERCIAL

## 2021-09-29 NOTE — PROGRESS NOTES
[address] [facility]  [address] [place] [address],[address], [facility] patricia, [place]  Phone [phone]  Fax  SUMMARY  Patient Name: Ivan Vargas : 1980   Treatment/Medical Diagnosis: Left forearm pain [D55.867]   Referral Source: Montrell Montiel MD     Date of Initial Visit: 21 Attended Visits: 9 Missed Visits: 0     SUMMARY OF TREATMENT  Pt was last seen for L forearm pain on 21. Pt stopped in earlier this month after her trip to state that she is back to normal and will be discharging from PT at this time. Please refer to PN on 21 for final measurements. RECOMMENDATIONS  Discontinue therapy. Progressing towards or have reached established goals. If you have any questions/comments please contact us directly at [phone]. Thank you for allowing us to assist in the care of your patient.     Therapist Signature: Zeke Alas DPT Date: 21     Time: 10:22 AM

## 2022-04-02 ASSESSMENT — TONOMETRY
OS_IOP_MMHG: 15
OS_IOP_MMHG: 15
OD_IOP_MMHG: 14
OD_IOP_MMHG: 15

## 2022-04-02 ASSESSMENT — VISUAL ACUITY
OD_SC: 20/20
OD_CC: 20/20+2
OS_CC: 20/20-1
OD_SC: J1+
OS_SC: 20/30
OS_SC: J1+